# Patient Record
Sex: FEMALE | Race: WHITE | NOT HISPANIC OR LATINO | URBAN - METROPOLITAN AREA
[De-identification: names, ages, dates, MRNs, and addresses within clinical notes are randomized per-mention and may not be internally consistent; named-entity substitution may affect disease eponyms.]

---

## 2021-01-13 ENCOUNTER — APPOINTMENT (RX ONLY)
Dept: URBAN - METROPOLITAN AREA CLINIC 117 | Facility: CLINIC | Age: 21
Setting detail: DERMATOLOGY
End: 2021-01-13

## 2021-01-13 VITALS — TEMPERATURE: 98.3 F

## 2021-01-13 DIAGNOSIS — L70.0 ACNE VULGARIS: ICD-10-CM

## 2021-01-13 PROCEDURE — 99203 OFFICE O/P NEW LOW 30 MIN: CPT

## 2021-01-13 PROCEDURE — ? TREATMENT REGIMEN

## 2021-01-13 PROCEDURE — ? PRESCRIPTION

## 2021-01-13 PROCEDURE — ? COUNSELING

## 2021-01-13 RX ORDER — MINOCYCLINE 40 MG/G
AEROSOL, FOAM TOPICAL
Qty: 1 | Refills: 2 | COMMUNITY
Start: 2021-01-13

## 2021-01-13 RX ORDER — TRETIONIN 0.25 MG/G
CREAM TOPICAL QHS
Qty: 1 | Refills: 2 | COMMUNITY
Start: 2021-01-13

## 2021-01-13 RX ORDER — DOXYCYCLINE HYCLATE 100 MG/1
TABLET, COATED ORAL BID
Qty: 60 | Refills: 2 | COMMUNITY
Start: 2021-01-13

## 2021-01-13 RX ADMIN — DOXYCYCLINE HYCLATE 1: 100 TABLET, COATED ORAL at 00:00

## 2021-01-13 RX ADMIN — TRETIONIN 1: 0.25 CREAM TOPICAL at 00:00

## 2021-01-13 RX ADMIN — MINOCYCLINE 1: 40 AEROSOL, FOAM TOPICAL at 00:00

## 2021-01-13 ASSESSMENT — LOCATION ZONE DERM
LOCATION ZONE: TRUNK
LOCATION ZONE: FACE

## 2021-01-13 ASSESSMENT — LOCATION DETAILED DESCRIPTION DERM
LOCATION DETAILED: LEFT CLAVICULAR SKIN
LOCATION DETAILED: LEFT SUPERIOR UPPER BACK
LOCATION DETAILED: LEFT CENTRAL MALAR CHEEK

## 2021-01-13 ASSESSMENT — LOCATION SIMPLE DESCRIPTION DERM
LOCATION SIMPLE: LEFT UPPER BACK
LOCATION SIMPLE: LEFT CLAVICULAR SKIN
LOCATION SIMPLE: LEFT CHEEK

## 2021-01-13 NOTE — PROCEDURE: TREATMENT REGIMEN
Detail Level: Zone
Samples Given: Cera ve am, Cera ve pm
Otc Regimen: Cera ve am, Cera ve pm, LHA cleanser
Plan: Patient aware not to use BPO wash while using amzeeq
Initiate Treatment: Amzeeq 4% topical foam in the morning\\nTretinoin 0.025% cream pea size amount at bed time \\nDoxycycline 100mg twice daily x 3 months
no

## 2021-01-13 NOTE — PROCEDURE: MIPS QUALITY
Quality 130: Documentation Of Current Medications In The Medical Record: Eligible clinician attests to documenting in the medical record the patient is not eligible for a current list of medications being obtained, updated, or reviewed by the eligible clinician
Detail Level: Generalized
Additional Notes: Patient states they do not take any medications.

## 2021-01-13 NOTE — PROCEDURE: COUNSELING
Include Pregnancy/Lactation Warning?: No
Bactrim Counseling:  I discussed with the patient the risks of sulfa antibiotics including but not limited to GI upset, allergic reaction, drug rash, diarrhea, dizziness, photosensitivity, and yeast infections. Rarely, more serious reactions can occur including but not limited to aplastic anemia, agranulocytosis, methemoglobinemia, blood dyscrasias, liver or kidney failure, lung infiltrates or desquamative/blistering drug rashes.
Topical Sulfur Applications Counseling: Topical Sulfur Counseling: Patient counseled that this medication may cause skin irritation or allergic reactions. In the event of skin irritation, the patient was advised to reduce the amount of the drug applied or use it less frequently. The patient verbalized understanding of the proper use and possible adverse effects of topical sulfur application. All of the patient's questions and concerns were addressed.
Minocycline Pregnancy And Lactation Text: This medication is Pregnancy Category D and not consider safe during pregnancy. It is also excreted in breast milk.
Tazorac Counseling:  Patient advised that medication is irritating and drying. Patient may need to apply sparingly and wash off after an hour before eventually leaving it on overnight. The patient verbalized understanding of the proper use and possible adverse effects of tazorac. All of the patient's questions and concerns were addressed.
Isotretinoin Pregnancy And Lactation Text: This medication is Pregnancy Category X and is considered extremely dangerous during pregnancy. It is unknown if it is excreted in breast milk.
Benzoyl Peroxide Counseling: Patient counseled that medicine may cause skin irritation and bleach clothing. In the event of skin irritation, the patient was advised to reduce the amount of the drug applied or use it less frequently. The patient verbalized understanding of the proper use and possible adverse effects of benzoyl peroxide. All of the patient's questions and concerns were addressed.
Doxycycline Pregnancy And Lactation Text: This medication is Pregnancy Category D and not consider safe during pregnancy. It is also excreted in breast milk but is considered safe for shorter treatment courses.
Tetracycline Counseling: Patient counseled regarding possible photosensitivity and increased risk for sunburn. Patient instructed to avoid sunlight, if possible. When exposed to sunlight, patients should wear protective clothing, sunglasses, and sunscreen. The patient was instructed to call the office immediately if the following severe adverse effects occur:  hearing changes, easy bruising/bleeding, severe headache, or vision changes. The patient verbalized understanding of the proper use and possible adverse effects of tetracycline. All of the patient's questions and concerns were addressed. Patient understands to avoid pregnancy while on therapy due to potential birth defects.
Dapsone Pregnancy And Lactation Text: This medication is Pregnancy Category C and is not considered safe during pregnancy or breast feeding.
Sarecycline Counseling: Patient advised regarding possible photosensitivity and discoloration of the teeth, skin, lips, tongue and gums. Patient instructed to avoid sunlight, if possible. When exposed to sunlight, patients should wear protective clothing, sunglasses, and sunscreen. The patient was instructed to call the office immediately if the following severe adverse effects occur:  hearing changes, easy bruising/bleeding, severe headache, or vision changes. The patient verbalized understanding of the proper use and possible adverse effects of sarecycline. All of the patient's questions and concerns were addressed.
Bactrim Pregnancy And Lactation Text: This medication is Pregnancy Category D and is known to cause fetal risk. It is also excreted in breast milk.
Detail Level: Zone
Tazorac Pregnancy And Lactation Text: This medication is not safe during pregnancy. It is unknown if this medication is excreted in breast milk.
Topical Sulfur Applications Pregnancy And Lactation Text: This medication is Pregnancy Category C and has an unknown safety profile during pregnancy. It is unknown if this topical medication is excreted in breast milk.
High Dose Vitamin A Counseling: Side effects reviewed, pt to contact office should one occur.
Benzoyl Peroxide Pregnancy And Lactation Text: This medication is Pregnancy Category C. It is unknown if benzoyl peroxide is excreted in breast milk.
Dapsone Counseling: I discussed with the patient the risks of dapsone including but not limited to hemolytic anemia, agranulocytosis, rashes, methemoglobinemia, kidney failure, peripheral neuropathy, headaches, GI upset, and liver toxicity. Patients who start dapsone require monitoring including baseline LFTs and weekly CBCs for the first month, then every month thereafter. The patient verbalized understanding of the proper use and possible adverse effects of dapsone. All of the patient's questions and concerns were addressed.
Erythromycin Counseling:  I discussed with the patient the risks of erythromycin including but not limited to GI upset, allergic reaction, drug rash, diarrhea, increase in liver enzymes, and yeast infections.
Spironolactone Pregnancy And Lactation Text: This medication can cause feminization of the male fetus and should be avoided during pregnancy. The active metabolite is also found in breast milk.
High Dose Vitamin A Pregnancy And Lactation Text: High dose vitamin A therapy is contraindicated during pregnancy and breast feeding.
Azithromycin Counseling:  I discussed with the patient the risks of azithromycin including but not limited to GI upset, allergic reaction, drug rash, diarrhea, and yeast infections.
Topical Retinoid counseling:  Patient advised to apply a pea-sized amount only at bedtime and wait 30 minutes after washing their face before applying. If too drying, patient may add a non-comedogenic moisturizer. The patient verbalized understanding of the proper use and possible adverse effects of retinoids. All of the patient's questions and concerns were addressed.
Topical Clindamycin Counseling: Patient counseled that this medication may cause skin irritation or allergic reactions. In the event of skin irritation, the patient was advised to reduce the amount of the drug applied or use it less frequently. The patient verbalized understanding of the proper use and possible adverse effects of clindamycin. All of the patient's questions and concerns were addressed.
Erythromycin Pregnancy And Lactation Text: This medication is Pregnancy Category B and is considered safe during pregnancy. It is also excreted in breast milk.
Birth Control Pills Pregnancy And Lactation Text: This medication should be avoided if pregnant and for the first 30 days post-partum.
Minocycline Counseling: Patient advised regarding possible photosensitivity and discoloration of the teeth, skin, lips, tongue and gums. Patient instructed to avoid sunlight, if possible. When exposed to sunlight, patients should wear protective clothing, sunglasses, and sunscreen. The patient was instructed to call the office immediately if the following severe adverse effects occur:  hearing changes, easy bruising/bleeding, severe headache, or vision changes. The patient verbalized understanding of the proper use and possible adverse effects of minocycline. All of the patient's questions and concerns were addressed.
Spironolactone Counseling: Patient advised regarding risks of diarrhea, abdominal pain, hyperkalemia, birth defects (for female patients), liver toxicity and renal toxicity. The patient may need blood work to monitor liver and kidney function and potassium levels while on therapy. The patient verbalized understanding of the proper use and possible adverse effects of spironolactone. All of the patient's questions and concerns were addressed.
Azithromycin Pregnancy And Lactation Text: This medication is considered safe during pregnancy and is also secreted in breast milk.
Topical Clindamycin Pregnancy And Lactation Text: This medication is Pregnancy Category B and is considered safe during pregnancy. It is unknown if it is excreted in breast milk.
Isotretinoin Counseling: Patient should get monthly blood tests, not donate blood, not drive at night if vision affected, not share medication, and not undergo elective surgery for 6 months after tx completed. Side effects reviewed, pt to contact office should one occur.
Doxycycline Counseling:  Patient counseled regarding possible photosensitivity and increased risk for sunburn. Patient instructed to avoid sunlight, if possible. When exposed to sunlight, patients should wear protective clothing, sunglasses, and sunscreen. The patient was instructed to call the office immediately if the following severe adverse effects occur:  hearing changes, easy bruising/bleeding, severe headache, or vision changes. The patient verbalized understanding of the proper use and possible adverse effects of doxycycline. All of the patient's questions and concerns were addressed.
Topical Retinoid Pregnancy And Lactation Text: This medication is Pregnancy Category C. It is unknown if this medication is excreted in breast milk.
Birth Control Pills Counseling: Birth Control Pill Counseling: I discussed with the patient the potential side effects of OCPs including but not limited to increased risk of stroke, heart attack, thrombophlebitis, deep venous thrombosis, hepatic adenomas, breast changes, GI upset, headaches, and depression. The patient verbalized understanding of the proper use and possible adverse effects of OCPs. All of the patient's questions and concerns were addressed.

## 2021-01-13 NOTE — HPI: PIMPLES (ACNE)
What Type Of Note Output Would You Prefer (Optional)?: Standard Output
How Severe Is Your Acne?: mild
Is This A New Presentation, Or A Follow-Up?: Acne
Females Only: When Was Your Last Menstrual Period?: 12/15/2020